# Patient Record
Sex: FEMALE | Race: OTHER | HISPANIC OR LATINO | Employment: FULL TIME | URBAN - METROPOLITAN AREA
[De-identification: names, ages, dates, MRNs, and addresses within clinical notes are randomized per-mention and may not be internally consistent; named-entity substitution may affect disease eponyms.]

---

## 2025-03-05 ENCOUNTER — TELEMEDICINE (OUTPATIENT)
Dept: PRIMARY CARE | Facility: CLINIC | Age: 25
End: 2025-03-05
Payer: COMMERCIAL

## 2025-03-05 DIAGNOSIS — B97.89 VIRAL RESPIRATORY ILLNESS: Primary | ICD-10-CM

## 2025-03-05 DIAGNOSIS — J98.8 VIRAL RESPIRATORY ILLNESS: Primary | ICD-10-CM

## 2025-03-05 PROCEDURE — 99213 OFFICE O/P EST LOW 20 MIN: CPT

## 2025-03-05 PROCEDURE — 1036F TOBACCO NON-USER: CPT

## 2025-03-05 RX ORDER — BENZONATATE 200 MG/1
200 CAPSULE ORAL 3 TIMES DAILY PRN
Qty: 15 CAPSULE | Refills: 0 | Status: SHIPPED | OUTPATIENT
Start: 2025-03-05 | End: 2025-03-10

## 2025-03-05 RX ORDER — IPRATROPIUM BROMIDE 21 UG/1
2 SPRAY, METERED NASAL EVERY 12 HOURS
Qty: 30 ML | Refills: 0 | Status: SHIPPED | OUTPATIENT
Start: 2025-03-05 | End: 2025-03-08

## 2025-03-05 ASSESSMENT — ENCOUNTER SYMPTOMS
SORE THROAT: 1
FEVER: 1
COUGH: 1
ABDOMINAL PAIN: 1

## 2025-03-05 NOTE — PROGRESS NOTES
Subjective   Patient ID: Oneida Russell is a 25 y.o. female who presents for Cough (Prev PCP Dr Jaeger (2 years ago)/Cough, stuff nose, body aches, fever 100.4. Denies NVD, Denies HA. Symptoms x 5 days).    Patient here today via virtual visit  Symptoms started 5 days prior: initially began with sore throat, fever and congestion, eventually moved to cough and hoarseness of voice.  Brother is having similar symptoms who lives with her.  Denies any SOB, no CP, no ongoing fever, no GI complaints.  Did not vaccinate against influenza this year, had 3 total COVID vaccinations  Currently managing symptoms with pseudoephedrine.   Most bothersome symptom at this time is congestion.    Cough  Associated symptoms include chest pain, a fever and a sore throat.   Fever   This is a recurrent problem. The current episode started in the past 7 days. The problem occurs 2 to 4 times per day. The problem has been gradually worsening. The maximum temperature noted was 100 to 100.9 F. The temperature was taken using an axillary reading. Associated symptoms include abdominal pain, chest pain, congestion, coughing, muscle aches and a sore throat.   Risk factors: sick contacts    Risk factors: no contaminated food, no contaminated water, no hx of cancer, no immunosuppression, no occupational exposure, no recent sickness and no recent travel         Review of Systems   Constitutional:  Positive for fever.   HENT:  Positive for congestion and sore throat.    Respiratory:  Positive for cough.    Cardiovascular:  Positive for chest pain.   Gastrointestinal:  Positive for abdominal pain.       Objective   There were no vitals taken for this visit.    Physical Exam  Physical exam was limited secondary to the nature of this visit.  Patient appeared in no acute distress.  Was alert, oriented and participatory with interview.  No increased work of breathing was noted   Assessment/Plan   Problem List Items Addressed This Visit    None  Visit Diagnoses          Codes    Viral respiratory illness    -  Primary J98.8, B97.89    Relevant Medications    benzonatate (Tessalon) 200 mg capsule    ipratropium (Atrovent) 21 mcg (0.03 %) nasal spray        Patient currently outside the window for antiviral therapy either Paxlovid or Tamiflu.  Additionally patient without significant pulmonary history, not obese does not pose significant risk for complications associated with coronavirus for would not elect to treat otherwise.  Stressed importance of conservative management, recommend plenty of fluids and rest additionally may supplement with 100 mg zinc to lessen duration of viral illness.  Symptomatic management with decongestants/cough suppressant.  Recommend patient follow-up with office if symptoms fail to improve or worsen over the next several days.  Tahir Orta, DO